# Patient Record
Sex: FEMALE | Race: WHITE | Employment: FULL TIME | URBAN - METROPOLITAN AREA
[De-identification: names, ages, dates, MRNs, and addresses within clinical notes are randomized per-mention and may not be internally consistent; named-entity substitution may affect disease eponyms.]

---

## 2017-10-16 PROBLEM — F32.9 MAJOR DEPRESSIVE DISORDER WITH SINGLE EPISODE, REMISSION STATUS UNSPECIFIED: Status: ACTIVE | Noted: 2017-10-16

## 2017-10-16 PROBLEM — Z00.00 WELL ADULT EXAM: Status: ACTIVE | Noted: 2017-10-16

## 2017-10-16 PROBLEM — E66.01 MORBID OBESITY (HCC): Status: ACTIVE | Noted: 2017-10-16

## 2017-10-16 PROBLEM — J30.1 ALLERGIC RHINITIS DUE TO POLLEN, UNSPECIFIED CHRONICITY, UNSPECIFIED SEASONALITY: Status: ACTIVE | Noted: 2017-10-16

## 2017-10-16 PROBLEM — Z80.41 FAMILY HISTORY OF OVARIAN CANCER: Status: ACTIVE | Noted: 2017-10-16

## 2017-10-16 PROBLEM — J45.20 MILD INTERMITTENT ASTHMA WITHOUT COMPLICATION: Status: ACTIVE | Noted: 2017-10-16

## 2017-10-16 PROBLEM — F17.200 SMOKING: Status: ACTIVE | Noted: 2017-10-16

## 2017-10-23 ENCOUNTER — LAB ENCOUNTER (OUTPATIENT)
Dept: LAB | Age: 47
End: 2017-10-23
Attending: FAMILY MEDICINE
Payer: COMMERCIAL

## 2017-10-23 DIAGNOSIS — E66.01 MORBID OBESITY (HCC): ICD-10-CM

## 2017-10-23 DIAGNOSIS — Z80.41 FAMILY HISTORY OF OVARIAN CANCER: ICD-10-CM

## 2017-10-23 DIAGNOSIS — Z00.00 WELL ADULT EXAM: ICD-10-CM

## 2017-10-23 PROCEDURE — 84443 ASSAY THYROID STIM HORMONE: CPT

## 2017-10-23 PROCEDURE — 83036 HEMOGLOBIN GLYCOSYLATED A1C: CPT

## 2017-10-23 PROCEDURE — 36415 COLL VENOUS BLD VENIPUNCTURE: CPT

## 2017-10-23 PROCEDURE — 85025 COMPLETE CBC W/AUTO DIFF WBC: CPT

## 2017-10-23 PROCEDURE — 80061 LIPID PANEL: CPT

## 2017-10-23 PROCEDURE — 80053 COMPREHEN METABOLIC PANEL: CPT

## 2017-10-23 PROCEDURE — 86304 IMMUNOASSAY TUMOR CA 125: CPT

## 2017-10-23 PROCEDURE — 81003 URINALYSIS AUTO W/O SCOPE: CPT

## 2017-10-23 PROCEDURE — 82306 VITAMIN D 25 HYDROXY: CPT

## 2017-10-26 PROCEDURE — 87624 HPV HI-RISK TYP POOLED RSLT: CPT | Performed by: FAMILY MEDICINE

## 2017-10-26 PROCEDURE — 88175 CYTOPATH C/V AUTO FLUID REDO: CPT | Performed by: FAMILY MEDICINE

## 2017-12-11 PROBLEM — D69.6 THROMBOCYTOPENIA (HCC): Status: ACTIVE | Noted: 2017-12-11

## 2017-12-11 PROBLEM — R92.8 ABNORMAL MAMMOGRAM: Status: ACTIVE | Noted: 2017-12-11

## 2017-12-26 ENCOUNTER — HOSPITAL ENCOUNTER (EMERGENCY)
Age: 47
Discharge: HOME | End: 2017-12-26
Payer: COMMERCIAL

## 2017-12-26 ENCOUNTER — APPOINTMENT (OUTPATIENT)
Dept: GENERAL RADIOLOGY | Age: 47
End: 2017-12-26
Attending: PHYSICIAN ASSISTANT
Payer: COMMERCIAL

## 2017-12-26 ENCOUNTER — APPOINTMENT (OUTPATIENT)
Dept: ULTRASOUND IMAGING | Age: 47
End: 2017-12-26
Attending: PHYSICIAN ASSISTANT
Payer: COMMERCIAL

## 2017-12-26 VITALS
RESPIRATION RATE: 18 BRPM | HEIGHT: 61 IN | OXYGEN SATURATION: 96 % | WEIGHT: 249 LBS | TEMPERATURE: 98 F | BODY MASS INDEX: 47.01 KG/M2 | SYSTOLIC BLOOD PRESSURE: 142 MMHG | DIASTOLIC BLOOD PRESSURE: 76 MMHG | HEART RATE: 87 BPM

## 2017-12-26 DIAGNOSIS — S86.912A KNEE STRAIN, LEFT, INITIAL ENCOUNTER: Primary | ICD-10-CM

## 2017-12-26 DIAGNOSIS — M70.52 SUPRAPATELLAR BURSITIS OF LEFT KNEE: ICD-10-CM

## 2017-12-26 PROCEDURE — 99284 EMERGENCY DEPT VISIT MOD MDM: CPT

## 2017-12-26 PROCEDURE — 73560 X-RAY EXAM OF KNEE 1 OR 2: CPT | Performed by: PHYSICIAN ASSISTANT

## 2017-12-26 PROCEDURE — 93971 EXTREMITY STUDY: CPT | Performed by: PHYSICIAN ASSISTANT

## 2017-12-26 RX ORDER — NAPROXEN 500 MG/1
500 TABLET ORAL 2 TIMES DAILY PRN
Qty: 30 TABLET | Refills: 0 | Status: SHIPPED | OUTPATIENT
Start: 2017-12-26 | End: 2018-01-02

## 2017-12-26 NOTE — ED PROVIDER NOTES
Patient Seen in: THE [facility] Emergency Department In [place]    History   Patient presents with:  Lower Extremity Injury (musculoskeletal)    Stated Complaint: left knee pain     HPI    Shweta Jack is a 50-year-old female who comes in today complaining of left Musculoskeletal:        Left hip: Normal.        Left knee: She exhibits decreased range of motion and swelling.  She exhibits no effusion, no ecchymosis, no deformity, no laceration, no erythema, normal alignment, no LCL laxity, normal patellar mobility, and bakers cyst  TECHNIQUE:  Real time, grey scale, and duplex ultrasound was used to evaluate the lower extremity venous system. B-mode two-dimensional images of the vascular structures, Doppler spectral analysis, and color flow.   Doppler imaging were per SMITA Peterson/ Mikie  [address] [facility]  [phone]    Schedule an appointment as soon as possible for a visit  If symptoms worsen        Medications Prescribed:  Current Discharge Medication List    START taking these medications    gagan

## 2017-12-26 NOTE — ED INITIAL ASSESSMENT (HPI)
Pt here lt knee pain and swelling for the past couple days with no known injury. Pt states she has slight pain to calf and behind knee.

## 2018-01-03 NOTE — PROGRESS NOTES
Patient needs appointment   Future Appointments  Date Time Provider Karlee Lock   1/8/2018 3:40 PM Terence Junior MD [facility]   6/7/2018 3:00 PM Aníbal Donohue [facility]

## 2018-01-08 PROBLEM — M23.92 INTERNAL DERANGEMENT OF KNEE, LEFT: Status: ACTIVE | Noted: 2018-01-08

## 2018-01-22 PROBLEM — S83.242D ACUTE MEDIAL MENISCAL TEAR, LEFT, SUBSEQUENT ENCOUNTER: Status: ACTIVE | Noted: 2018-01-22

## 2018-01-31 ENCOUNTER — EKG ENCOUNTER (OUTPATIENT)
Dept: LAB | Age: 48
End: 2018-01-31
Attending: ORTHOPAEDIC SURGERY
Payer: COMMERCIAL

## 2018-01-31 DIAGNOSIS — E66.01 MORBID OBESITY (HCC): Primary | ICD-10-CM

## 2018-01-31 LAB
ATRIAL RATE: 90 BPM
P AXIS: 61 DEGREES
P-R INTERVAL: 144 MS
Q-T INTERVAL: 364 MS
QRS DURATION: 90 MS
QTC CALCULATION (BEZET): 445 MS
R AXIS: 30 DEGREES
T AXIS: 52 DEGREES
VENTRICULAR RATE: 90 BPM

## 2018-01-31 PROCEDURE — 93005 ELECTROCARDIOGRAM TRACING: CPT

## 2018-01-31 PROCEDURE — 93010 ELECTROCARDIOGRAM REPORT: CPT | Performed by: INTERNAL MEDICINE

## 2018-02-01 NOTE — PROGRESS NOTES
Normal sinus rhythm with sinus arrhythmia  Possible Left atrial enlargement  Borderline ECG  No previous ECGs available  Confirmed by NENA WILKES SHARON

## 2018-02-05 PROBLEM — M22.42 CHONDROMALACIA PATELLAE OF LEFT KNEE: Status: ACTIVE | Noted: 2018-02-05

## 2018-02-05 PROBLEM — Z13.6 SCREENING FOR CARDIOVASCULAR CONDITION: Status: ACTIVE | Noted: 2018-02-05

## 2018-03-05 PROBLEM — M25.462 EFFUSION OF KNEE JOINT, LEFT: Status: ACTIVE | Noted: 2018-03-05

## 2018-03-05 PROBLEM — M17.12 PATELLOFEMORAL ARTHRITIS OF LEFT KNEE: Status: ACTIVE | Noted: 2018-03-05

## 2018-04-30 PROBLEM — M17.12 PRIMARY OSTEOARTHRITIS OF LEFT KNEE: Status: ACTIVE | Noted: 2018-04-30

## 2018-05-22 PROBLEM — R22.42 MASS OF LEG, LEFT: Status: ACTIVE | Noted: 2018-05-22

## 2018-09-07 PROCEDURE — 81003 URINALYSIS AUTO W/O SCOPE: CPT | Performed by: FAMILY MEDICINE

## 2022-08-06 ENCOUNTER — HOSPITAL ENCOUNTER (EMERGENCY)
Age: 52
Discharge: HOME | End: 2022-08-06
Attending: EMERGENCY MEDICINE
Payer: COMMERCIAL

## 2022-08-06 ENCOUNTER — APPOINTMENT (OUTPATIENT)
Dept: GENERAL RADIOLOGY | Age: 52
End: 2022-08-06
Payer: COMMERCIAL

## 2022-08-06 VITALS
DIASTOLIC BLOOD PRESSURE: 89 MMHG | SYSTOLIC BLOOD PRESSURE: 156 MMHG | HEIGHT: 61 IN | BODY MASS INDEX: 47.2 KG/M2 | OXYGEN SATURATION: 94 % | TEMPERATURE: 98 F | RESPIRATION RATE: 18 BRPM | WEIGHT: 250 LBS | HEART RATE: 84 BPM

## 2022-08-06 DIAGNOSIS — R07.89 CHEST WALL PAIN: Primary | ICD-10-CM

## 2022-08-06 PROCEDURE — 99283 EMERGENCY DEPT VISIT LOW MDM: CPT

## 2022-08-06 PROCEDURE — 71045 X-RAY EXAM CHEST 1 VIEW: CPT

## 2022-08-06 RX ORDER — METHYLPREDNISOLONE 4 MG/1
TABLET ORAL
Qty: 1 EACH | Refills: 0 | Status: SHIPPED | OUTPATIENT
Start: 2022-08-06

## 2022-08-06 RX ORDER — IBUPROFEN 800 MG/1
800 TABLET ORAL EVERY 8 HOURS PRN
Qty: 30 TABLET | Refills: 0 | Status: SHIPPED | OUTPATIENT
Start: 2022-08-06 | End: 2022-08-13

## 2022-08-06 RX ORDER — CYCLOBENZAPRINE HCL 10 MG
10 TABLET ORAL 3 TIMES DAILY PRN
Qty: 20 TABLET | Refills: 0 | Status: SHIPPED | OUTPATIENT
Start: 2022-08-06

## 2022-08-06 NOTE — ED INITIAL ASSESSMENT (HPI)
C/o left sided rib cage pain radiates to the back started last night while she's letting the dog out- she turned and hurt her ribs, increased pain on deep breathing and coughing. Had covid 3 weeks ago.

## (undated) NOTE — LETTER
January 3, 2018    Marissa Graham      Dear Magi Alvarado:    Dr. Edward Godfrey was notified of your recent trip to the ER.  In order to provide you with the best continuation in care, she would like to see you for a hospital

## (undated) NOTE — ED AVS SNAPSHOT
Lesly Peterson   MRN: [id number]    Department:  THE [facility] Emergency Department in [place]   Date of Visit:  12/26/2017           Disclosure     Insurance plans vary and the physician(s) referred by the ER may not be covered by your plan.  Please conta tell this physician (or your personal doctor if your instructions are to return to your personal doctor) about any new or lasting problems. The primary care or specialist physician will see patients referred from the [facility] Emergency Department.  Kylah Krueger